# Patient Record
Sex: MALE | Race: WHITE
[De-identification: names, ages, dates, MRNs, and addresses within clinical notes are randomized per-mention and may not be internally consistent; named-entity substitution may affect disease eponyms.]

---

## 2018-02-03 ENCOUNTER — HOSPITAL ENCOUNTER (INPATIENT)
Dept: HOSPITAL 63 - ER | Age: 68
LOS: 2 days | Discharge: HOME | DRG: 682 | End: 2018-02-05
Attending: INTERNAL MEDICINE | Admitting: INTERNAL MEDICINE
Payer: MEDICARE

## 2018-02-03 VITALS — DIASTOLIC BLOOD PRESSURE: 70 MMHG | SYSTOLIC BLOOD PRESSURE: 127 MMHG

## 2018-02-03 VITALS — DIASTOLIC BLOOD PRESSURE: 70 MMHG | SYSTOLIC BLOOD PRESSURE: 148 MMHG

## 2018-02-03 VITALS — SYSTOLIC BLOOD PRESSURE: 117 MMHG | DIASTOLIC BLOOD PRESSURE: 56 MMHG

## 2018-02-03 VITALS — BODY MASS INDEX: 25.01 KG/M2 | HEIGHT: 68 IN | WEIGHT: 165 LBS

## 2018-02-03 DIAGNOSIS — I10: ICD-10-CM

## 2018-02-03 DIAGNOSIS — J81.1: ICD-10-CM

## 2018-02-03 DIAGNOSIS — I73.9: ICD-10-CM

## 2018-02-03 DIAGNOSIS — Z79.899: ICD-10-CM

## 2018-02-03 DIAGNOSIS — I25.10: ICD-10-CM

## 2018-02-03 DIAGNOSIS — Z82.49: ICD-10-CM

## 2018-02-03 DIAGNOSIS — E78.5: ICD-10-CM

## 2018-02-03 DIAGNOSIS — E87.6: ICD-10-CM

## 2018-02-03 DIAGNOSIS — R79.1: ICD-10-CM

## 2018-02-03 DIAGNOSIS — J44.1: ICD-10-CM

## 2018-02-03 DIAGNOSIS — N17.9: Primary | ICD-10-CM

## 2018-02-03 DIAGNOSIS — F10.20: ICD-10-CM

## 2018-02-03 DIAGNOSIS — Z79.82: ICD-10-CM

## 2018-02-03 DIAGNOSIS — R09.02: ICD-10-CM

## 2018-02-03 DIAGNOSIS — F17.210: ICD-10-CM

## 2018-02-03 DIAGNOSIS — Z95.5: ICD-10-CM

## 2018-02-03 DIAGNOSIS — J18.1: ICD-10-CM

## 2018-02-03 DIAGNOSIS — Z95.1: ICD-10-CM

## 2018-02-03 DIAGNOSIS — J44.0: ICD-10-CM

## 2018-02-03 DIAGNOSIS — Z88.8: ICD-10-CM

## 2018-02-03 DIAGNOSIS — G47.00: ICD-10-CM

## 2018-02-03 DIAGNOSIS — I69.351: ICD-10-CM

## 2018-02-03 DIAGNOSIS — E43: ICD-10-CM

## 2018-02-03 DIAGNOSIS — K76.0: ICD-10-CM

## 2018-02-03 DIAGNOSIS — J90: ICD-10-CM

## 2018-02-03 DIAGNOSIS — Z82.3: ICD-10-CM

## 2018-02-03 DIAGNOSIS — Z80.1: ICD-10-CM

## 2018-02-03 DIAGNOSIS — J81.0: ICD-10-CM

## 2018-02-03 DIAGNOSIS — R59.0: ICD-10-CM

## 2018-02-03 LAB
% BANDS: 3 % (ref 0–9)
% LYMPHS: 4 % (ref 24–48)
% MONOS: 7 % (ref 0–10)
% SEGS: 86 % (ref 35–66)
ALBUMIN SERPL-MCNC: 3.1 G/DL (ref 3.4–5)
ALBUMIN/GLOB SERPL: 0.6 {RATIO} (ref 1–1.7)
ALP SERPL-CCNC: 99 U/L (ref 46–116)
ALT SERPL-CCNC: 20 U/L (ref 16–63)
ANION GAP SERPL CALC-SCNC: 11 MMOL/L (ref 6–14)
AST SERPL-CCNC: 15 U/L (ref 15–37)
BASOPHILS # BLD AUTO: 0 X10^3/UL (ref 0–0.2)
BASOPHILS NFR BLD: 0 % (ref 0–3)
BILIRUB SERPL-MCNC: 1.5 MG/DL (ref 0.2–1)
BUN/CREAT SERPL: 11 (ref 6–20)
CA-I SERPL ISE-MCNC: 16 MG/DL (ref 8–26)
CALCIUM SERPL-MCNC: 9.2 MG/DL (ref 8.5–10.1)
CHLORIDE SERPL-SCNC: 94 MMOL/L (ref 98–107)
CK SERPL-CCNC: 37 U/L (ref 39–308)
CO2 SERPL-SCNC: 29 MMOL/L (ref 21–32)
CREAT SERPL-MCNC: 1.4 MG/DL (ref 0.7–1.3)
EOSINOPHIL NFR BLD: 0 % (ref 0–3)
EOSINOPHIL NFR BLD: 0 X10^3/UL (ref 0–0.7)
ERYTHROCYTE [DISTWIDTH] IN BLOOD BY AUTOMATED COUNT: 13.4 % (ref 11.5–14.5)
GFR SERPLBLD BASED ON 1.73 SQ M-ARVRAT: 50.4 ML/MIN
GLOBULIN SER-MCNC: 5 G/DL (ref 2.2–3.8)
GLUCOSE SERPL-MCNC: 132 MG/DL (ref 70–99)
HCT VFR BLD CALC: 46.3 % (ref 39–53)
HGB BLD-MCNC: 16 G/DL (ref 13–17.5)
INFLUENZA A PATIENT: NEGATIVE
INFLUENZA B PATIENT: NEGATIVE
LIPASE: 114 U/L (ref 73–393)
LYMPHOCYTES # BLD: 0.9 X10^3/UL (ref 1–4.8)
LYMPHOCYTES NFR BLD AUTO: 5 % (ref 24–48)
MCH RBC QN AUTO: 32 PG (ref 25–35)
MCHC RBC AUTO-ENTMCNC: 35 G/DL (ref 31–37)
MCV RBC AUTO: 94 FL (ref 79–100)
MONO #: 1.3 X10^3/UL (ref 0–1.1)
MONOCYTES NFR BLD: 6 % (ref 0–9)
NEUT #: 18.4 X10^3UL (ref 1.8–7.7)
NEUTROPHILS NFR BLD AUTO: 89 % (ref 31–73)
PLATELET # BLD AUTO: 303 X10^3/UL (ref 140–400)
PLATELET # BLD EST: ADEQUATE 10*3/UL
POLYCHROMASIA BLD QL SMEAR: SLIGHT
POTASSIUM SERPL-SCNC: 3.9 MMOL/L (ref 3.5–5.1)
PROT SERPL-MCNC: 8.1 G/DL (ref 6.4–8.2)
RBC # BLD AUTO: 4.94 X10^6/UL (ref 4.3–5.7)
SODIUM SERPL-SCNC: 134 MMOL/L (ref 136–145)
TOXIC GRANULES BLD QL SMEAR: PRESENT
WBC # BLD AUTO: 20.6 X10^3/UL (ref 4–11)
WBC TOXIC VACUOLES BLD QL SMEAR: PRESENT

## 2018-02-03 PROCEDURE — 71046 X-RAY EXAM CHEST 2 VIEWS: CPT

## 2018-02-03 PROCEDURE — 96374 THER/PROPH/DIAG INJ IV PUSH: CPT

## 2018-02-03 PROCEDURE — 83690 ASSAY OF LIPASE: CPT

## 2018-02-03 PROCEDURE — 84132 ASSAY OF SERUM POTASSIUM: CPT

## 2018-02-03 PROCEDURE — 85025 COMPLETE CBC W/AUTO DIFF WBC: CPT

## 2018-02-03 PROCEDURE — 80061 LIPID PANEL: CPT

## 2018-02-03 PROCEDURE — 36415 COLL VENOUS BLD VENIPUNCTURE: CPT

## 2018-02-03 PROCEDURE — 94640 AIRWAY INHALATION TREATMENT: CPT

## 2018-02-03 PROCEDURE — 85007 BL SMEAR W/DIFF WBC COUNT: CPT

## 2018-02-03 PROCEDURE — 84484 ASSAY OF TROPONIN QUANT: CPT

## 2018-02-03 PROCEDURE — 87040 BLOOD CULTURE FOR BACTERIA: CPT

## 2018-02-03 PROCEDURE — 83880 ASSAY OF NATRIURETIC PEPTIDE: CPT

## 2018-02-03 PROCEDURE — 71275 CT ANGIOGRAPHY CHEST: CPT

## 2018-02-03 PROCEDURE — 90686 IIV4 VACC NO PRSV 0.5 ML IM: CPT

## 2018-02-03 PROCEDURE — 83605 ASSAY OF LACTIC ACID: CPT

## 2018-02-03 PROCEDURE — 85379 FIBRIN DEGRADATION QUANT: CPT

## 2018-02-03 PROCEDURE — 80053 COMPREHEN METABOLIC PANEL: CPT

## 2018-02-03 PROCEDURE — 87804 INFLUENZA ASSAY W/OPTIC: CPT

## 2018-02-03 PROCEDURE — 82550 ASSAY OF CK (CPK): CPT

## 2018-02-03 PROCEDURE — 85027 COMPLETE CBC AUTOMATED: CPT

## 2018-02-03 RX ADMIN — METHYLPREDNISOLONE SODIUM SUCCINATE SCH MG: 40 INJECTION, POWDER, FOR SOLUTION INTRAMUSCULAR; INTRAVENOUS at 20:38

## 2018-02-03 RX ADMIN — MONTELUKAST SODIUM SCH MG: 10 TABLET ORAL at 20:38

## 2018-02-03 RX ADMIN — CEFTRIAXONE SODIUM SCH GM: 1 INJECTION, POWDER, FOR SOLUTION INTRAMUSCULAR; INTRAVENOUS at 18:02

## 2018-02-03 RX ADMIN — IPRATROPIUM BROMIDE AND ALBUTEROL SULFATE SCH ML: .5; 3 SOLUTION RESPIRATORY (INHALATION) at 21:25

## 2018-02-03 RX ADMIN — IPRATROPIUM BROMIDE AND ALBUTEROL SULFATE SCH ML: .5; 3 SOLUTION RESPIRATORY (INHALATION) at 16:45

## 2018-02-03 NOTE — PHYS DOC
General


Chief Complaint:  SHORTNESS OF BREATH


Stated Complaint:  COUGH,CONGESTION,FEVER,HX OF COPD


Time Seen by MD:  11:06


Source:  patient


Exam Limitations:  no limitations


Problems:  





History of Present Illness


Initial Comments


Patient is a 67-year-old male who comes to the ED complaining of fever and 

cough.


Patient states that for the past 3 days he's had worsening nonproductive cough 

and chest congestion, fever and chills, and dyspnea on exertion. Patient also 

states that throughout this 3 day. He's had left upper anterior chest pain 

worse with coughing, no new leg swelling, nausea vomiting diaphoresis arm or 

neck symptoms. Patient has history of COPD as well as coronary artery disease, 

he continues to smoke 1-1/2 packs per day and is fearful that he may have 

influenza or pneumonia. He denies any new or progressive chest pain complaints 

the last 12-24 hours and was trying to make it through the weekend without 

coming to the emergency department and follow-up with his doctor on Monday. 

Today the discomfort with his coughing had grown so severe that he asked his 

wife to bring him to the emergency department. Patient takes aspirin daily no 

other anticoagulation.


ED vitals: 100.6, 105, 20, 140/61, 95% room air


Timing/Duration:  other


Severity:  severe


Modifying Factors:  worse with movement, improves with rest


Associated Symptoms:  chest pain, cough, fever/chills, malaise, shortness of 

breath


Allergies:  


Coded Allergies:  


     Statins-Hmg-Coa Reductase Inhibitor (Verified  Allergy, Unknown, 2/3/18)





Past Medical History


Medical History:  COPD, CVA/TIA/stroke, heart disease, high cholesterol, 

hypertension, vascular disease


Surgical History:  other (4 vessel CABG , bilateral lower extremity bypass 

grafts)





Social History


Smoker:  cigarettes


Alcohol:  none


Drugs:  none





Review of Systems


Constitutional:  see HPI


Respiratory:  see HPI


Cardiovascular:  see HPI


Gastrointestinal:  denies abdominal pain, denies nausea, denies vomiting


Genitourinary:  denies dysuria, denies frequency, denies hematuria


Musculoskeletal:  see HPI, denies back pain, denies joint swelling


Psychiatric/Neurological:  denies headache, pre-existing deficit (right-sided 

weakness from prior CVA.)


Hematologic/Lymphatic:  denies blood clots, denies easy bleeding, denies easy 

bruising





Physical Exam


General Appearance:  no apparent distress


Ear, Nose, Throat:  hearing grossly normal, normal ENT inspection, normal 

pharynx


Neck:  non-tender, supple


Respiratory:  chest non-tender, no respiratory distress, decreased breath 

sounds (R base), accessory muscle use, rhonchi (ESCOBAR), wheezing (diffusely)


Cardiovascular:  normal peripheral pulses, tachycardia


Gastrointestinal:  non tender, soft


Extremities:  normal range of motion, non-tender, normal inspection


Neurologic/Psychiatric:  alert, normal mood/affect, oriented x 3, other (mild R 

weakness (CVA sequelae))


Skin:  normal color, warm/dry





Orders, Labs, Meds


EKG: Sinus tachycardia 116 bpm, PVC, PAC, no significant ST segment elevation 

interpreted by me.














PATIENT: PARVIN RUIZ ACCOUNT: [id number] MRN#: [id number]


: 1950 LOCATION: ER AGE: 67


SEX: M EXAM DT: 18 ACCESSION#: [id number]


STATUS: REG ER ORD. PHYSICIAN: SUPRIYA HERNANDEZ DO 


REASON: fever, cough, h/o COPD


PROCEDURE: CHEST PA & LATERAL





Indication: Fever, cough, shortness of breath for one week.  History of COPD.





Technique: PA and lateral views of the chest





Comparison: None





Findings:


Median sternotomy noted.  Heart is normal in size.  Lungs are hyperinflated. 


Focal consolidation is seen in the inferior aspect of the left upper lobe. 


Right lung is clear.  No pneumothorax.  Blunting of the left costophrenic


angle noted.  Visualized bony thorax is within normal limits.





Impression:


Findings suggesting left upper lobe pneumonia with small left pleural


effusion.














DICTATED AND SIGNED BY:     RAVI VIRGEN DO


DATE:     18 1137





CC: RIGO KLINE MD; SUPRIYA HERNANDEZ DO ~








1157: Left upper lobe pneumonia noted, patient also wheezing diffusely without 

physical exam evidence of acute CHF. Rocephin and Zithromax ordered 

intravenously as well as Solu-Medrol, DuoNeb and CTA of the chest due to d-

dimer 2.3.














PATIENT: PARVIN RUIZ ACCOUNT: [id number] MRN#: [id number]


: 1950 LOCATION: ER AGE: 67


SEX: M EXAM DT: 18 ACCESSION#: [id number]


STATUS: REG ER ORD. PHYSICIAN: SUPRIYA HERNANDEZ DO 


REASON: cp, elev d-dimer


PROCEDURE: CT ANGIOGRAPHY CHEST





    Indication: Shortness of breath with cough for one week.  History of COPD.





Technique: CT angiogram of the chest with 70 mL of Omnipaque 300 with


multiplanar MIP reformats.





Comparison: None





Findings:


Diagnostic quality AP study.  There are no central, segmental or subsegmental


filling defects in the pulmonary arteries.  Enlarged mediastinal and hilar


lymph nodes noted.  Index lymph nodes as follows:


Left paratracheal lymph node measuring 1.5 x 1.6 cm.  Aortopulmonary recess


lymph node measuring 1.1 x 1.9 cm.  Left hilar lymph node measuring 1.6 x 2.4


cm


Second left hilar lymph node measuring 2.1 x 1.5 cm.





Neck bases clear.  No axillary adenopathy.  Heart is normal in size.  Coronary


artery calcifications noted.  No pericardial effusion.  Small left pleural


effusion.





Patchy areas of consolidation is seen in the left upper lobe and in the


lingula with at bronchograms.  Mild centrilobular emphysema noted.  3 mm


nodule is seen in the right apex (series 4 image 12).





Scattered patchy opacities are seen in the right lower lobe (series 4 image


97) (series 4 image 102).





Hepatic steatosis noted.  Otherwise, visualized sections through the liver,


spleen, gallbladder, pancreas are within normal limits.  Adrenal glands show


nodular thickening which may be secondary to adenomatous hyperplasia.  There


is a 6 cm simple appearing cyst in the visualized left kidney.  2.1 x 1.2 cm


superficial well-circumscribed low attenuating lesion is seen in the anterior


left chest likely sebaceous or epidermoid cyst.  Allograft no suspicious bony


lesions.





Impression:


1.  No PE.


2.  Left upper lobe pneumonia with small left pleural effusion.  Few Patchy


opacities in the right lower lobe may be secondary to multifocal pneumonia or


aspiration.


3.  Mediastinal and hilar lymphadenopathy likely reactive.  Follow-up CT chest


after medical therapy recommended to show resolution.


4.  Hepatic steatosis.

















PQRS Compliance Statement:





One or more of the following individualized dose reduction techniques were


utilized for this examination:


1. Automated exposure control


2. Adjustment of the mA and/or kV according to patient size


3. Use of iterative reconstruction technique

















DICTATED AND SIGNED BY:     RAVI VIRGEN DO


DATE:     18 2778





CC: RIGO KLINE MD; SUPRIYA HERNANDEZ DO ~








Cultures obtained, white blood cells 20.6, bands 3, d-dimer 2.3, BUN 16, 

creatinine 1.4, lactic acid 1.9, troponin less than 0.017, , influenza A 

and B both negative





1310: Patient rechecked, he is breathing much easier after Solu-Medrol, DuoNeb, 

and guaifenesin with codeine syrup. I discussed the need for inpatient 

treatment and although he is reluctant because of tomorrow Super Bowl he is 

agreeable to admission and further evaluation and treatment as necessary.





1338: I discussed patient with Dr. Andres on call hospitalist who accepts patient 

for admission. Is available he requests ICU but will accept telemetry inpatient 

admission if needed.











Impressions:


Multilobar pneumonia


Chest pain with history of coronary artery disease


COPD exacerbation


Renal insufficiency


Tobaccoism


Departure


Time of Disposition:  13:39


Disposition:  09 ADMITTED AS INPATIENT


Condition:  IMPROVED











SUPRIYA HERNANDEZ DO Feb 3, 2018 11:46

## 2018-02-03 NOTE — RAD
Indication: Fever, cough, shortness of breath for one week.  History of COPD.



Technique: PA and lateral views of the chest



Comparison: None



Findings:

Median sternotomy noted.  Heart is normal in size.  Lungs are hyperinflated. 

Focal consolidation is seen in the inferior aspect of the left upper lobe. 

Right lung is clear.  No pneumothorax.  Blunting of the left costophrenic

angle noted.  Visualized bony thorax is within normal limits.



Impression:

Findings suggesting left upper lobe pneumonia with small left pleural

effusion.

## 2018-02-03 NOTE — HP
ADMIT DATE:  2018



HISTORY OF PRESENT ILLNESS:  The patient is a 67-year-old  male patient

who came to the Emergency Room with a complaint of fever and cough that has been

going on for the last 3 days with worsening nonproductive cough and chest

congestion, fever, chills, dyspnea on exertion and throughout the last 3 days,

he also complained of left upper anterior chest pain, worse with coughing, but

denied any leg swelling.  Denied any phlegm or hemoptysis.  The patient stated

that he is fearful that he might have influenza or pneumonia.  He was basically

evaluated in the Emergency Room, was found to have marked leukocytosis with a

white cell count of 20,000, elevated D-dimer.  His serology was negative for

influenza A and B and had had a CT angiogram, which basically showed no

pulmonary embolism, has left upper lobe pneumonia and small left pleural

effusion, has few patchy opacities in the right lower lobe, may be secondary to

multifocal pneumonia or aspiration.  He has also mediastinal hilar

lymphadenopathy, likely reactive as well as hepatic steatosis.  The radiologist

recommended a followup CT chest after medical therapy to show resolution.  The

patient was admitted with COPD exacerbation, community-acquired pneumonia and

here he is to continue with IV antibiotic, steroid treatment, as well as

bronchodilator.  He is a heavy alcohol drinker and continues to smoke heavily up

to 1-1/2 pack a day.



PAST MEDICAL HISTORY:  Significant for COPD, hypertension, hyperlipidemia,

coronary artery disease status post CABG and peripheral vascular disease, status

post stenting, as well as bilateral lower extremity bypass grafting.  He has

also had CVA and TIA with right-sided weakness.  He continued to have residual

right-sided hemiparesis and also he is unsteady on his gait because of that.



PAST SURGICAL HISTORY:  Significant for percutaneous coronary intervention with

stent deployment and also stent deployment to both lower extremity arteries.  He

has coronary artery disease status post CABG and also bilateral femoropopliteal

bypass graft surgery.  He has had jaw surgery, and colonoscopy.



ALLERGIES:  HE IS INTOLERANT TO STATINS, SEVERE ACHES, PAINS AND ARTHRALGIAS,

ARTHRITIS and is unable to walk because it is severe with Crestor according to

him.



MEDICATIONS:  He is currently on following medications:  He is on aspirin 325 mg

once a day, lisinopril/hydrochlorothiazide 20/12.5 mg 1 tablet once a day.



FAMILY HISTORY:  He has 1 older sister  at the age of 58 because of the CVA.

 His father  at the age of 60 because of myocardial infarction.  Mother 

at the age of 60 because of stroke.  He has one brother younger, has lung

cancer, hypertension and one sister still alive, but does not keep in touch with

her.



SOCIAL HISTORY:  He is , has no children.  He smokes 1-1/2 pack a day,

smokes multi cigars.  He drinks mostly hard liquor on a daily basis; however, he

denied any delirium tremens or alcohol withdrawal seizures.  He is retired from

the [employer].



REVIEW OF SYSTEMS:  The patient denied any blurring of vision, cataract,

glaucoma or macular degeneration.  Denied any earache, tinnitus or sensorineural

deafness.  Denied any nosebleeds, stuffy nose or postnasal drip.  Denied any

sore throat, sore tongue, toothache, hoarseness of voice or difficulty

swallowing.  Denied any nausea, vomiting, diarrhea or constipation.  Denied any

hematemesis, melena or hematochezia.  Denied any dysuria, frequency or

hematuria.  He did complain of chest pain that has resolved.  He has cough,

which is mostly dry, nonproductive, did complain of shortness of breath, fever

or chills.  Denied any dizziness, lightheadedness, or vertigo.  He has residual

weakness in the right side using a cane and he is unsteady and has fallen

sometimes.



PHYSICAL EXAMINATION:

GENERAL:  On arrival to the Emergency Room, he looked well and was clearly in no

apparent respiratory distress, pale, but no jaundice, cyanosis or thyromegaly. 

No jugular venous distention.  No lower limb edema.

VITAL SIGNS:  His heart rate was 105, blood pressure was 129/80, temperature was

100.6, respiratory rate was 20, and oxygen saturation was 95% on room air.

HEENT:  Showed normocephalic, atraumatic.

NECK:  Supple.

HEART:  Showed normal first and second heart sounds with no gallop, rub or

murmur.

CHEST:  Shows central trachea, equal bilateral expansion, air entry, vesicular

breath sounds.  I could not really appreciate any crepitation or rhonchi.

ABDOMEN:  Slightly distended, soft, nontender.  No guarding or rigidity.  No

organomegaly.  Hernial orifice intact.  Bowel sounds normal.

NEUROLOGIC:  He is awake, alert, responding appropriately.  Cranial nerves

intact.

EXTREMITIES:  He moves extremities without difficulty, ambulates with a cane.



LABORATORY DATA:  On arrival showed a white cell count of 20,600, hemoglobin was

16, hematocrit 46, MCV 94 and platelet count of 303,000 with a manual

differential showed 89% polymorphs, 5% lymphocytes, and 6% monocytes.  Serum

sodium was 134, potassium 3.9, chloride 94, bicarbonate 29, anion gap of 11, BUN

16, creatinine 1.4, estimated GFR was 50 mL per minute, his glucose 132, lactic

acid was 1.9.  Calcium was 9.2.  Total bilirubin 1.5.  AST, ALT, alkaline

phosphatase were normal.  His beta natriuretic peptide was high at 961.  Total

protein was 8.1.  His albumin was 3.1, lipase was 114.  His D-dimer was 2.30. 

His influenza A and B were negative.  He has a chest x-ray, which basically

showed the patient has mediastinal sternotomy noted.  Heart is normal in size. 

Lungs are hyperinflated.  Focal consolidation seen in the inferior aspect of the

left upper lobe.  Right lung is clear, no pneumothorax.  Blunting of left

costophrenic angle is noted.  Visualized bony thorax is within normal limits. 

The CT angio showed no evidence of pulmonary embolism; however, he has left

upper lobe pneumonia and small pleural effusion, few patchy opacities in the

right lower lobe, may be secondary to multifocal pneumonia or aspiration.  He

has mediastinal hilar lymphadenopathy, likely reactive.  Followup CT scan after

medical family therapy recommended showed resolution.  He has also hepatic

steatosis.



IMPRESSION:  In summary, this is a 67-year-old  male patient who was

admitted with a community-acquired pneumonia, chronic obstructive pulmonary

disease exacerbation, tobaccoism, alcoholism and mild renal insufficiency.



PLAN:  To continue with IV antibiotic.  Continue with his home medication and I

will add steroids and bronchodilators as well as Singulair and Pulmicort and

will repeat all his lab works tomorrow and decide the further management

according to his response.





______________________________

IZAIAH DUNBAR MD



DR:  PAPITO/bhavani  JOB#:  [id number] / [id number]

DD:  2018 15:58  DT:  2018 20:35

## 2018-02-03 NOTE — RAD
Indication: Shortness of breath with cough for one week.  History of COPD.



Technique: CT angiogram of the chest with 70 mL of Omnipaque 300 with

multiplanar MIP reformats.



Comparison: None



Findings:

Diagnostic quality AP study.  There are no central, segmental or subsegmental

filling defects in the pulmonary arteries.  Enlarged mediastinal and hilar

lymph nodes noted.  Index lymph nodes as follows:

Left paratracheal lymph node measuring 1.5 x 1.6 cm.  Aortopulmonary recess

lymph node measuring 1.1 x 1.9 cm.  Left hilar lymph node measuring 1.6 x 2.4

cm

Second left hilar lymph node measuring 2.1 x 1.5 cm.



Neck bases clear.  No axillary adenopathy.  Heart is normal in size.  Coronary

artery calcifications noted.  No pericardial effusion.  Small left pleural

effusion.



Patchy areas of consolidation is seen in the left upper lobe and in the

lingula with at bronchograms.  Mild centrilobular emphysema noted.  3 mm

nodule is seen in the right apex (series 4 image 12).



Scattered patchy opacities are seen in the right lower lobe (series 4 image

97) (series 4 image 102).



Hepatic steatosis noted.  Otherwise, visualized sections through the liver,

spleen, gallbladder, pancreas are within normal limits.  Adrenal glands show

nodular thickening which may be secondary to adenomatous hyperplasia.  There

is a 6 cm simple appearing cyst in the visualized left kidney.  2.1 x 1.2 cm

superficial well-circumscribed low attenuating lesion is seen in the anterior

left chest likely sebaceous or epidermoid cyst.  Allograft no suspicious bony

lesions.



Impression:

1.  No PE.

2.  Left upper lobe pneumonia with small left pleural effusion.  Few Patchy

opacities in the right lower lobe may be secondary to multifocal pneumonia or

aspiration.

3.  Mediastinal and hilar lymphadenopathy likely reactive.  Follow-up CT chest

after medical therapy recommended to show resolution.

4.  Hepatic steatosis.











PQRS Compliance Statement:



One or more of the following individualized dose reduction techniques were

utilized for this examination:

1. Automated exposure control

2. Adjustment of the mA and/or kV according to patient size

3. Use of iterative reconstruction technique

## 2018-02-04 VITALS — SYSTOLIC BLOOD PRESSURE: 153 MMHG | DIASTOLIC BLOOD PRESSURE: 51 MMHG

## 2018-02-04 VITALS — DIASTOLIC BLOOD PRESSURE: 73 MMHG | SYSTOLIC BLOOD PRESSURE: 127 MMHG

## 2018-02-04 VITALS — SYSTOLIC BLOOD PRESSURE: 116 MMHG | DIASTOLIC BLOOD PRESSURE: 64 MMHG

## 2018-02-04 VITALS — DIASTOLIC BLOOD PRESSURE: 67 MMHG | SYSTOLIC BLOOD PRESSURE: 135 MMHG

## 2018-02-04 VITALS — DIASTOLIC BLOOD PRESSURE: 69 MMHG | SYSTOLIC BLOOD PRESSURE: 145 MMHG

## 2018-02-04 LAB
ALBUMIN SERPL-MCNC: 2.7 G/DL (ref 3.4–5)
ALBUMIN/GLOB SERPL: 0.7 {RATIO} (ref 1–1.7)
ALP SERPL-CCNC: 95 U/L (ref 46–116)
ALT SERPL-CCNC: 18 U/L (ref 16–63)
ANION GAP SERPL CALC-SCNC: 13 MMOL/L (ref 6–14)
AST SERPL-CCNC: 13 U/L (ref 15–37)
BASOPHILS # BLD AUTO: 0 X10^3/UL (ref 0–0.2)
BASOPHILS NFR BLD: 0 % (ref 0–3)
BILIRUB SERPL-MCNC: 0.4 MG/DL (ref 0.2–1)
BUN/CREAT SERPL: 22 (ref 6–20)
CA-I SERPL ISE-MCNC: 29 MG/DL (ref 8–26)
CALCIUM SERPL-MCNC: 9 MG/DL (ref 8.5–10.1)
CHLORIDE SERPL-SCNC: 100 MMOL/L (ref 98–107)
CHOLEST SERPL-MCNC: 161 MG/DL (ref 0–200)
CHOLEST/HDLC SERPL: 7 {RATIO}
CO2 SERPL-SCNC: 27 MMOL/L (ref 21–32)
CREAT SERPL-MCNC: 1.3 MG/DL (ref 0.7–1.3)
EOSINOPHIL NFR BLD: 0 % (ref 0–3)
EOSINOPHIL NFR BLD: 0 X10^3/UL (ref 0–0.7)
ERYTHROCYTE [DISTWIDTH] IN BLOOD BY AUTOMATED COUNT: 13.7 % (ref 11.5–14.5)
GFR SERPLBLD BASED ON 1.73 SQ M-ARVRAT: 54.9 ML/MIN
GLOBULIN SER-MCNC: 4 G/DL (ref 2.2–3.8)
GLUCOSE SERPL-MCNC: 197 MG/DL (ref 70–99)
HCT VFR BLD CALC: 43.9 % (ref 39–53)
HDLC SERPL-MCNC: 23 MG/DL (ref 40–60)
HGB BLD-MCNC: 15.2 G/DL (ref 13–17.5)
LDLC: 115 MG/DL (ref 0–100)
LYMPHOCYTES # BLD: 0.5 X10^3/UL (ref 1–4.8)
LYMPHOCYTES NFR BLD AUTO: 2 % (ref 24–48)
MCH RBC QN AUTO: 32 PG (ref 25–35)
MCHC RBC AUTO-ENTMCNC: 35 G/DL (ref 31–37)
MCV RBC AUTO: 94 FL (ref 79–100)
MONO #: 0.5 X10^3/UL (ref 0–1.1)
MONOCYTES NFR BLD: 2 % (ref 0–9)
NEUT #: 18.8 X10^3UL (ref 1.8–7.7)
NEUTROPHILS NFR BLD AUTO: 95 % (ref 31–73)
PLATELET # BLD AUTO: 279 X10^3/UL (ref 140–400)
POTASSIUM SERPL-SCNC: 2.9 MMOL/L (ref 3.5–5.1)
PROT SERPL-MCNC: 6.7 G/DL (ref 6.4–8.2)
RBC # BLD AUTO: 4.69 X10^6/UL (ref 4.3–5.7)
SODIUM SERPL-SCNC: 140 MMOL/L (ref 136–145)
TRIGL SERPL-MCNC: 117 MG/DL (ref 0–150)
VLDLC: 23 MG/DL (ref 0–40)
WBC # BLD AUTO: 19.8 X10^3/UL (ref 4–11)

## 2018-02-04 RX ADMIN — METHYLPREDNISOLONE SODIUM SUCCINATE SCH MG: 40 INJECTION, POWDER, FOR SOLUTION INTRAMUSCULAR; INTRAVENOUS at 05:21

## 2018-02-04 RX ADMIN — LISINOPRIL SCH MG: 20 TABLET ORAL at 08:39

## 2018-02-04 RX ADMIN — METHYLPREDNISOLONE SODIUM SUCCINATE SCH MG: 40 INJECTION, POWDER, FOR SOLUTION INTRAMUSCULAR; INTRAVENOUS at 13:59

## 2018-02-04 RX ADMIN — ASPIRIN 325 MG ORAL TABLET SCH MG: 325 PILL ORAL at 08:39

## 2018-02-04 RX ADMIN — POTASSIUM CHLORIDE SCH MEQ: 1500 TABLET, EXTENDED RELEASE ORAL at 21:15

## 2018-02-04 RX ADMIN — MONTELUKAST SODIUM SCH MG: 10 TABLET ORAL at 21:15

## 2018-02-04 RX ADMIN — IPRATROPIUM BROMIDE AND ALBUTEROL SULFATE SCH ML: .5; 3 SOLUTION RESPIRATORY (INHALATION) at 05:24

## 2018-02-04 RX ADMIN — FOLIC ACID SCH MG: 1 TABLET ORAL at 08:40

## 2018-02-04 RX ADMIN — THIAMINE HYDROCHLORIDE SCH MG: 100 INJECTION, SOLUTION INTRAMUSCULAR; INTRAVENOUS at 08:41

## 2018-02-04 RX ADMIN — IPRATROPIUM BROMIDE AND ALBUTEROL SULFATE SCH ML: .5; 3 SOLUTION RESPIRATORY (INHALATION) at 10:58

## 2018-02-04 RX ADMIN — MULTIPLE VITAMINS W/ MINERALS TAB SCH TAB: TAB at 08:40

## 2018-02-04 RX ADMIN — CEFTRIAXONE SODIUM SCH GM: 1 INJECTION, POWDER, FOR SOLUTION INTRAMUSCULAR; INTRAVENOUS at 16:18

## 2018-02-04 RX ADMIN — Medication SCH CAP: at 21:15

## 2018-02-05 VITALS — SYSTOLIC BLOOD PRESSURE: 111 MMHG | DIASTOLIC BLOOD PRESSURE: 62 MMHG

## 2018-02-05 LAB
ALBUMIN SERPL-MCNC: 2.4 G/DL (ref 3.4–5)
ALBUMIN/GLOB SERPL: 0.6 {RATIO} (ref 1–1.7)
ALP SERPL-CCNC: 81 U/L (ref 46–116)
ALT SERPL-CCNC: 19 U/L (ref 16–63)
ANION GAP SERPL CALC-SCNC: 8 MMOL/L (ref 6–14)
AST SERPL-CCNC: 11 U/L (ref 15–37)
BILIRUB SERPL-MCNC: 0.2 MG/DL (ref 0.2–1)
BUN/CREAT SERPL: 35 (ref 6–20)
CA-I SERPL ISE-MCNC: 39 MG/DL (ref 8–26)
CALCIUM SERPL-MCNC: 8.8 MG/DL (ref 8.5–10.1)
CHLORIDE SERPL-SCNC: 106 MMOL/L (ref 98–107)
CO2 SERPL-SCNC: 28 MMOL/L (ref 21–32)
CREAT SERPL-MCNC: 1.1 MG/DL (ref 0.7–1.3)
ERYTHROCYTE [DISTWIDTH] IN BLOOD BY AUTOMATED COUNT: 13.5 % (ref 11.5–14.5)
GFR SERPLBLD BASED ON 1.73 SQ M-ARVRAT: 66.6 ML/MIN
GLOBULIN SER-MCNC: 4.3 G/DL (ref 2.2–3.8)
GLUCOSE SERPL-MCNC: 136 MG/DL (ref 70–99)
HCT VFR BLD CALC: 43.2 % (ref 39–53)
HGB BLD-MCNC: 14.7 G/DL (ref 13–17.5)
MCH RBC QN AUTO: 32 PG (ref 25–35)
MCHC RBC AUTO-ENTMCNC: 34 G/DL (ref 31–37)
MCV RBC AUTO: 95 FL (ref 79–100)
PLATELET # BLD AUTO: 316 X10^3/UL (ref 140–400)
POTASSIUM SERPL-SCNC: 4.5 MMOL/L (ref 3.5–5.1)
PROT SERPL-MCNC: 6.7 G/DL (ref 6.4–8.2)
RBC # BLD AUTO: 4.57 X10^6/UL (ref 4.3–5.7)
SODIUM SERPL-SCNC: 142 MMOL/L (ref 136–145)
WBC # BLD AUTO: 22.9 X10^3/UL (ref 4–11)

## 2018-02-05 RX ADMIN — MULTIPLE VITAMINS W/ MINERALS TAB SCH TAB: TAB at 08:55

## 2018-02-05 RX ADMIN — THIAMINE HYDROCHLORIDE SCH MG: 100 INJECTION, SOLUTION INTRAMUSCULAR; INTRAVENOUS at 08:56

## 2018-02-05 RX ADMIN — ASPIRIN 325 MG ORAL TABLET SCH MG: 325 PILL ORAL at 08:55

## 2018-02-05 RX ADMIN — LISINOPRIL SCH MG: 20 TABLET ORAL at 08:55

## 2018-02-05 RX ADMIN — Medication SCH CAP: at 08:55

## 2018-02-05 RX ADMIN — FOLIC ACID SCH MG: 1 TABLET ORAL at 08:55

## 2018-02-05 RX ADMIN — POTASSIUM CHLORIDE SCH MEQ: 1500 TABLET, EXTENDED RELEASE ORAL at 08:56

## 2018-02-05 NOTE — PN
DATE:  02/04/2018



SUBJECTIVE:  The patient is resting, slightly propped up in bed, in no apparent

distress, has no more chest pain.  He continued to have mild chest tightness and

wheezing.  Generally, feeling much better.  His potassium was low this morning

at 2.9.



PHYSICAL EXAMINATION:

GENERAL:  When I examined him, he looked well and was clearly in no apparent

respiratory distress, no pallor, jaundice, cyanosis, or thyromegaly.  No jugular

venous distension.  No limb edema.

VITAL SIGNS:  His heart rate was 100, blood pressure 116/64, temperature was

98.4, respiratory rate 20, and oxygen saturation was 92% on room air.

HEAD, EYES, EARS, NOSE AND THROAT:  Showed he is normocephalic, atraumatic.

NECK:  Supple.

HEART:  Showed normal first and second heart sounds with no gallop, rub or

murmur.

CHEST:  Clear to auscultation.  Chest showed central trachea, equal bilateral

expansion, air entry, ____ few bilateral scattered rhonchi, more so on the left

than the right.  I could not appreciate any crepitation.

ABDOMEN:  Slightly distended, soft, nontender.

NEUROLOGIC:  He was awake, alert, responding appropriately.  Cranial nerves

intact.  He moves extremities without difficulty.  He ambulates without

assistance or assistive devices.



LABORATORY DATA:  Showed a white cell count of 19,800, hemoglobin 15, hematocrit

44, MCV 94 and platelet count 279,000.  His serum sodium this morning was 140,

potassium 2.9, chloride 100, bicarbonate 27, anion gap of 13, BUN 29, creatinine

1.3, estimated GFR was 54 mL per minute.  His glucose was 197, calcium was 9. 

Total bilirubin, AST, ALT, alkaline phosphatase were normal.  He has 3 sets of

cardiac enzymes that were negative.  His serum triglycerides were 117, total

cholesterol was 161, LDL was 115, VLDL was 23, and HDL was 23, ratio was 7.



ASSESSMENT:

1.  Community-acquired pneumonia for which he is on IV Rocephin and Zithromax.

2.  Chronic obstructive pulmonary edema exacerbation for which he is on

bronchodilator and methylprednisolone.

3.  Acute kidney injury and his other problems include hypertension,

hyperlipidemia, has history of peripheral vascular disease status post bilateral

lower extremity bypass grafting, coronary artery disease status post coronary

artery bypass graft surgery.  He has also alcohol dependence and nicotine

dependence.



PLAN:  To continue with IV Rocephin and Zithromax.  Continue to start

replenishing his potassium.  We already gave him about 80 mEq a day and continue

with alcohol withdrawal protocol and if he remains stable tomorrow, we can

discharge him home on oral antibiotic and tapering course of steroids.





______________________________

IZAIAH DUNBAR MD



DR:  PAPITO/bhavani  JOB#:  [id number] / [id number]

DD:  02/04/2018 16:23  DT:  02/05/2018 11:20

## 2018-02-05 NOTE — PDOC3
Discharge Summary


Visit Information


Date of Admission:  Feb 3, 2018


Date of Discharge:  Feb 5, 2018


Final Diagnosis


Problems


Medical Problems:


(1) COPD exacerbation


Status: Acute  





(2) Pneumonia


Status: Acute  








1.  Community-acquired pneumonia for which he is on IV Rocephin and Zithromax. 

MUltifocal pneumonia


2.  Chronic obstructive pulmonary edema exacerbation for which he is on


bronchodilator and methylprednisolone.


3.  Acute kidney injury and his other problems include hypertension,


hyperlipidemia, has history of peripheral vascular disease status post bilateral


lower extremity bypass grafting, coronary artery disease status post coronary


artery bypass graft surgery.  He has also alcohol dependence and nicotine


dependence.


4 mediastinal and hilar adenopathy


5. sever protein calorie malnutirtion


6. acute hypoxic respiratory


7. elevated d dimer


8 hypokalemia-resolved


Problems:  





Brief Hospital Course


Allergies





 Allergies








Coded Allergies Type Severity Reaction Last Updated Verified


 


  Statins-Hmg-Coa Reductase Inhibitor Allergy Unknown  2/3/18 Yes








Vital Signs





Vital Signs








  Date Time  Temp Pulse Resp B/P (MAP) Pulse Ox O2 Delivery O2 Flow Rate FiO2


 


2/5/18 08:55  66  111/62    


 


2/5/18 08:00      Room Air  


 


2/5/18 05:28 97.6  20  97   


 


2/4/18 19:24       2.0 








Lab Results





Laboratory Tests








Test


  2/3/18


18:55 2/4/18


07:30 2/4/18


14:45 2/5/18


06:14


 


Troponin I Quantitative


  < 0.017 ng/mL


(0-0.055) < 0.017 ng/mL


(0-0.055) 


  


 


 


White Blood Count


  


  19.8 x10^3/uL


(4.0-11.0) 


  22.9 x10^3/uL


(4.0-11.0)


 


Red Blood Count


  


  4.69 x10^6/uL


(4.30-5.70) 


  4.57 x10^6/uL


(4.30-5.70)


 


Hemoglobin


  


  15.2 g/dL


(13.0-17.5) 


  14.7 g/dL


(13.0-17.5)


 


Hematocrit


  


  43.9 %


(39.0-53.0) 


  43.2 %


(39.0-53.0)


 


Mean Corpuscular Volume  94 fL ()   95 fL () 


 


Mean Corpuscular Hemoglobin  32 pg (25-35)   32 pg (25-35) 


 


Mean Corpuscular Hemoglobin


Concent 


  35 g/dL


(31-37) 


  34 g/dL


(31-37)


 


Red Cell Distribution Width


  


  13.7 %


(11.5-14.5) 


  13.5 %


(11.5-14.5)


 


Platelet Count


  


  279 x10^3/uL


(140-400) 


  316 x10^3/uL


(140-400)


 


Neutrophils (%) (Auto)  95 % (31-73)   


 


Lymphocytes (%) (Auto)  2 % (24-48)   


 


Monocytes (%) (Auto)  2 % (0-9)   


 


Eosinophils (%) (Auto)  0 % (0-3)   


 


Basophils (%) (Auto)  0 % (0-3)   


 


Neutrophils # (Auto)


  


  18.8 x10^3uL


(1.8-7.7) 


  


 


 


Lymphocytes # (Auto)


  


  0.5 x10^3/uL


(1.0-4.8) 


  


 


 


Monocytes # (Auto)


  


  0.5 x10^3/uL


(0.0-1.1) 


  


 


 


Eosinophils # (Auto)


  


  0.0 x10^3/uL


(0.0-0.7) 


  


 


 


Basophils # (Auto)


  


  0.0 x10^3/uL


(0.0-0.2) 


  


 


 


Sodium Level


  


  140 mmol/L


(136-145) 


  142 mmol/L


(136-145)


 


Potassium Level


  


  2.9 mmol/L


(3.5-5.1) 3.4 mmol/L


(3.5-5.1) 4.5 mmol/L


(3.5-5.1)


 


Chloride Level


  


  100 mmol/L


() 


  106 mmol/L


()


 


Carbon Dioxide Level


  


  27 mmol/L


(21-32) 


  28 mmol/L


(21-32)


 


Anion Gap  13 (6-14)   8 (6-14) 


 


Blood Urea Nitrogen


  


  29 mg/dL


(8-26) 


  39 mg/dL


(8-26)


 


Creatinine


  


  1.3 mg/dL


(0.7-1.3) 


  1.1 mg/dL


(0.7-1.3)


 


Estimated GFR


(Cockcroft-Gault) 


  54.9 


  


  66.6 


 


 


BUN/Creatinine Ratio  22 (6-20)   35 (6-20) 


 


Glucose Level


  


  197 mg/dL


(70-99) 


  136 mg/dL


(70-99)


 


Calcium Level


  


  9.0 mg/dL


(8.5-10.1) 


  8.8 mg/dL


(8.5-10.1)


 


Total Bilirubin


  


  0.4 mg/dL


(0.2-1.0) 


  0.2 mg/dL


(0.2-1.0)


 


Aspartate Amino Transf


(AST/SGOT) 


  13 U/L (15-37) 


  


  11 U/L (15-37) 


 


 


Alanine Aminotransferase


(ALT/SGPT) 


  18 U/L (16-63) 


  


  19 U/L (16-63) 


 


 


Alkaline Phosphatase


  


  95 U/L


() 


  81 U/L


()


 


Total Protein


  


  6.7 g/dL


(6.4-8.2) 


  6.7 g/dL


(6.4-8.2)


 


Albumin


  


  2.7 g/dL


(3.4-5.0) 


  2.4 g/dL


(3.4-5.0)


 


Albumin/Globulin Ratio  0.7 (1.0-1.7)   0.6 (1.0-1.7) 


 


Triglycerides Level


  


  117 mg/dL


(0-150) 


  


 


 


Cholesterol Level


  


  161 mg/dL


(0-200) 


  


 


 


LDL Cholesterol, Calculated


  


  115 mg/dL


(0-100) 


  


 


 


VLDL Cholesterol, Calculated


  


  23 mg/dL


(0-40) 


  


 


 


Non-HDL Cholesterol Calculated


  


  138 mg/dL


(0-129) 


  


 


 


HDL Cholesterol


  


  23 mg/dL


(40-60) 


  


 


 


Cholesterol/HDL Ratio  7.0   








Brief Hospital Course


Mr. Zuleta  is a 67 old [sex] who presented with [ ]





ADMIT DATE:  02/03/2018





HISTORY OF PRESENT ILLNESS:  The patient is a 67-year-old  male patient


who came to the Emergency Room with a complaint of fever and cough that has been


going on for the last 3 days with worsening nonproductive cough and chest


congestion, fever, chills, dyspnea on exertion and throughout the last 3 days,


he also complained of left upper anterior chest pain, worse with coughing, but


denied any leg swelling.  Denied any phlegm or hemoptysis.  The patient stated


that he is fearful that he might have influenza or pneumonia.  He was basically


evaluated in the Emergency Room, was found to have marked leukocytosis with a


white cell count of 20,000, elevated D-dimer.  His serology was negative for


influenza A and B and had had a CT angiogram, which basically showed no


pulmonary embolism, has left upper lobe pneumonia and small left pleural


effusion, has few patchy opacities in the right lower lobe, may be secondary to


multifocal pneumonia or aspiration.  He has also mediastinal hilar


lymphadenopathy, likely reactive as well as hepatic steatosis.  The radiologist


recommended a followup CT chest after medical therapy to show resolution.  The


patient was admitted with COPD exacerbation, community-acquired pneumonia and


here he is to continue with IV antibiotic, steroid treatment, as well as


bronchodilator.  He is a heavy alcohol drinker and continues to smoke heavily up


to 1-1/2 pack a day.


He was treated with IV steroids. breathing treatments and Iv antibiotics.  He 

improved daily and was ready for discharge on 2/5





Discharge Information


Condition at Discharge:  Improved, Stable


Disposition/Orders:  D/C to Home


Dischare Medications





Current Medications


Aspirin (Children'S Aspirin) 324 mg 1X  ONCE PO  Last administered on 2/3/18at 

11:39;  Start 2/3/18 at 11:30;  Stop 2/3/18 at 11:31;  Status DC


Nitroglycerin (Nitrostat) 0.4 mg PRN Q5MIN  PRN SL CP RATING > 1/10 Last 

administered on 2/3/18at 11:41;  Start 2/3/18 at 11:30;  Stop 2/3/18 at 13:44;  

Status DC


Ceftriaxone Sodium 1 gm/ Sodium Chloride 50 ml @  100 mls/hr 1X  ONCE IV  Last 

administered on 2/3/18at 12:03;  Start 2/3/18 at 12:00;  Stop 2/3/18 at 12:29;  

Status DC


Azithromycin 500 mg/Sodium Chloride 250 ml @  250 mls/hr 1X  ONCE IV  Last 

administered on 2/3/18at 12:59;  Start 2/3/18 at 12:00;  Stop 2/3/18 at 12:59;  

Status DC


Albuterol/ Ipratropium (Duoneb) 3 ml 1X  ONCE NEB  Last administered on 2/3/

18at 12:53;  Start 2/3/18 at 12:00;  Stop 2/3/18 at 12:02;  Status DC


Methylprednisolone Sodium Succinate (SOLU-Medrol 125MG VIAL) 125 mg 1X  ONCE IV

  Last administered on 2/3/18at 12:55;  Start 2/3/18 at 12:00;  Stop 2/3/18 at 

12:02;  Status DC


Guaifenesin/ Codeine Phosphate (Robitussin Ac) 5 ml 1X  ONCE PO  Last 

administered on 2/3/18at 12:57;  Start 2/3/18 at 12:00;  Stop 2/3/18 at 12:02;  

Status DC


Sodium Chloride 50 ml @ As Directed STK-MED ONCE .ROUTE ;  Start 2/3/18 at 11:57

;  Stop 2/3/18 at 11:58;  Status DC


Ceftriaxone Sodium (Rocephin) 1 gm STK-MED ONCE IV ;  Start 2/3/18 at 11:57;  

Stop 2/3/18 at 11:58;  Status DC


Iohexol (Omnipaque 300 Mg/ml) 75 ml 1X  ONCE IV ;  Start 2/3/18 at 12:15;  Stop 

2/3/18 at 12:16;  Status DC


Sodium Chloride 250 ml @  As Directed STK-MED ONCE .ROUTE ;  Start 2/3/18 at 12:

36;  Stop 2/3/18 at 12:37;  Status DC


Azithromycin (Zithromax) 500 mg STK-MED ONCE IV ;  Start 2/3/18 at 12:37;  Stop 

2/3/18 at 12:38;  Status DC


Ondansetron HCl (Zofran) 4 mg PRN Q4HRS  PRN IV NAUSEA/VOMITING;  Start 2/3/18 

at 13:45;  Stop 2/4/18 at 13:44;  Status DC


Acetaminophen (Tylenol) 650 mg PRN Q4HRS  PRN PO FEVER Last administered on 2/3/

18at 20:38;  Start 2/3/18 at 13:45;  Stop 2/4/18 at 13:44;  Status DC


Nitroglycerin (Nitrostat) 0.4 mg PRN Q5MIN  PRN SL CHEST PAIN;  Start 2/3/18 at 

13:45;  Stop 2/4/18 at 13:44;  Status DC


Albuterol/ Ipratropium (Duoneb) 3 ml RTQID NEB  Last administered on 2/4/18at 10

:58;  Start 2/3/18 at 16:00;  Stop 2/4/18 at 15:59;  Status DC


Guaifenesin/ Codeine Phosphate (Robitussin Ac) 5 ml PRN Q6HRS  PRN PO COUGH 

Last administered on 2/4/18at 22:36;  Start 2/3/18 at 13:45;  Stop 2/5/18 at 10:

08;  Status DC


Influenza Virus Vaccine Quadrival (Fluarix Quad [id number] Syringe) 0.5 ml ONCE 

ONCE VAX IM ;  Start 2/3/18 at 15:30;  Stop 2/3/18 at 21:47;  Status DC


Methylprednisolone Sodium Succinate (SOLU-Medrol 40MG VIAL) 40 mg Q8HRS IV  

Last administered on 2/4/18at 13:59;  Start 2/3/18 at 22:00;  Stop 2/4/18 at 16:

25;  Status DC


Montelukast Sodium (Singulair) 10 mg QHS PO  Last administered on 2/4/18at 21:15

;  Start 2/3/18 at 21:00;  Stop 2/5/18 at 10:08;  Status DC


Ceftriaxone Sodium 1 gm/ Sodium Chloride 50 ml @  100 mls/hr Q24H IV ;  Start 2/

3/18 at 16:15;  Stop 2/3/18 at 16:23;  Status DC


Aspirin (Christopher Aspirin) 325 mg DAILY PO  Last administered on 2/5/18at 08:55;  

Start 2/4/18 at 09:00;  Stop 2/5/18 at 10:08;  Status DC


Non-Formulary Medication 1 tab DAILY PO ;  Start 2/4/18 at 09:00;  Stop 2/4/18 

at 09:00;  Status DC


Multivitamins/ Calcium (Thera-M Plus) 1 tab DAILY PO  Last administered on 2/5/ 18at 08:55;  Start 2/4/18 at 09:00;  Stop 2/5/18 at 10:08;  Status DC


Folic Acid (Folic Acid) 1 mg DAILY PO  Last administered on 2/5/18at 08:55;  

Start 2/4/18 at 09:00;  Stop 2/5/18 at 10:08;  Status DC


Thiamine HCl 100 mg DAILY IM ;  Start 2/4/18 at 09:00;  Stop 2/5/18 at 10:08;  

Status DC


Chlordiazepoxide (Librium) 50 mg PRN Q1HR  PRN PO For CIWA 8-14 Last 

administered on 2/3/18at 20:38;  Start 2/3/18 at 16:15;  Stop 2/5/18 at 10:08;  

Status DC


Ceftriaxone Sodium (Rocephin) 1 gm Q24H IVP  Last administered on 2/4/18at 16:18

;  Start 2/3/18 at 16:30;  Stop 2/5/18 at 10:08;  Status DC


Lisinopril (Prinivil) 20 mg DAILY PO  Last administered on 2/5/18at 08:55;  

Start 2/4/18 at 09:00;  Stop 2/5/18 at 10:08;  Status DC


Hydrochlorothiazide (Microzide) 12.5 mg DAILY PO  Last administered on 2/5/18at 

08:55;  Start 2/4/18 at 09:00;  Stop 2/5/18 at 10:08;  Status DC


Influenza Virus Vaccine Quadrival (Fluarix Quad [id number] Syringe) 0.5 ml ONCE 

ONCE VAX IM  Last administered on 2/5/18at 08:58;  Start 2/4/18 at 20:00;  Stop 

2/4/18 at 20:01;  Status DC


Potassium Chloride (Klor-Con) 40 meq 1X  ONCE PO  Last administered on 2/4/18at 

10:34;  Start 2/4/18 at 09:00;  Stop 2/4/18 at 09:36;  Status DC


Potassium Chloride (Klor-Con) 40 meq 1X  ONCE PO  Last administered on 2/4/18at 

11:42;  Start 2/4/18 at 10:00;  Stop 2/4/18 at 10:01;  Status DC


Lactobacillus Rhamnosus (Culturelle) 1 cap BID PO  Last administered on 2/5/ 18at 08:55;  Start 2/4/18 at 21:00;  Stop 2/5/18 at 10:08;  Status DC


Azithromycin 500 mg/Sodium Chloride 250 ml @  250 mls/hr Q24H IV  Last 

administered on 2/4/18at 16:11;  Start 2/4/18 at 15:00;  Stop 2/5/18 at 10:08;  

Status DC


Potassium Chloride (Klor-Con) 20 meq TID PO  Last administered on 2/5/18at 08:56

;  Start 2/4/18 at 21:00;  Stop 2/5/18 at 10:08;  Status DC


Methylprednisolone Sodium Succinate (SOLU-Medrol 40MG VIAL) 40 mg Q12H IV  Last 

administered on 2/5/18at 02:19;  Start 2/5/18 at 02:00;  Stop 2/5/18 at 10:08;  

Status DC


Albuterol/ Ipratropium (Duoneb) 3 ml PRN QID  PRN NEB SHORTNESS OF BREATH;  

Start 2/4/18 at 16:30;  Stop 2/5/18 at 10:08;  Status DC


Melatonin 6 mg PRN QHS  PRN PO INSOMNIA Last administered on 2/4/18at 22:36;  

Start 2/4/18 at 22:15;  Stop 2/5/18 at 10:08;  Status DC





Active Scripts


Active


Prednisone 10 Mg Tablet 10 Mg PO DAILY


     40MG/DX2, 30MG/DX3, 20MG/DX2, 10MG/DX2 THEN STOP. START ON


     2/6


Levaquin (Levofloxacin) 750 Mg Tablet 1 Tab PO DAILY


Reported


Aspirin 325 Mg Tablet 1 Tab PO DAILY


     LAST DOSE GIVEN:


     DATE: TODAY


     TIME: AM


     NEXT DOSE DUE:


     DATE: TOMORROW


     TIME: AM


Lisinopril-Hctz 20-12.5 Mg Tab (Lisinopril/Hydrochlorothiazide) 1 Each Tablet 1 

Tab PO DAILY


     LAST DOSE GIVEN:


     DATE: TODAY


     TIME: AM


     NEXT DOSE DUE:


     DATE: TOMORROW


     TIME: AM





Patient Instructions


Patient Instuctions


Discharge instructions were typed on the emar for the patient.  MRAD done by 

physician.











OVIDIO URBANO DO Feb 5, 2018 15:55